# Patient Record
Sex: FEMALE | URBAN - METROPOLITAN AREA
[De-identification: names, ages, dates, MRNs, and addresses within clinical notes are randomized per-mention and may not be internally consistent; named-entity substitution may affect disease eponyms.]

---

## 2018-08-02 ENCOUNTER — OP HISTORICAL/CONVERTED ENCOUNTER (OUTPATIENT)
Dept: OTHER | Age: 68
End: 2018-08-02

## 2020-11-02 VITALS — HEIGHT: 65 IN

## 2020-11-02 PROBLEM — I10 HYPERTENSIVE DISORDER: Status: ACTIVE | Noted: 2020-11-02

## 2020-11-02 PROBLEM — E78.00 HYPERCHOLESTEROLEMIA: Status: ACTIVE | Noted: 2020-11-02

## 2020-11-02 PROBLEM — E66.9 OBESITY: Status: ACTIVE | Noted: 2020-11-02

## 2020-11-02 PROBLEM — Z78.0 MENOPAUSE: Status: ACTIVE | Noted: 2020-11-02

## 2020-11-03 ENCOUNTER — OFFICE VISIT (OUTPATIENT)
Dept: OBGYN CLINIC | Age: 70
End: 2020-11-03
Payer: MEDICARE

## 2020-11-03 VITALS
WEIGHT: 223 LBS | BODY MASS INDEX: 37.15 KG/M2 | SYSTOLIC BLOOD PRESSURE: 142 MMHG | HEIGHT: 65 IN | DIASTOLIC BLOOD PRESSURE: 76 MMHG

## 2020-11-03 DIAGNOSIS — Z12.4 SCREENING FOR MALIGNANT NEOPLASM OF CERVIX: Primary | ICD-10-CM

## 2020-11-03 DIAGNOSIS — E66.01 SEVERE OBESITY (HCC): ICD-10-CM

## 2020-11-03 DIAGNOSIS — Z12.31 VISIT FOR SCREENING MAMMOGRAM: Primary | ICD-10-CM

## 2020-11-03 DIAGNOSIS — N95.1 MENOPAUSAL SYNDROME: ICD-10-CM

## 2020-11-03 PROCEDURE — 77063 BREAST TOMOSYNTHESIS BI: CPT | Performed by: OBSTETRICS & GYNECOLOGY

## 2020-11-03 PROCEDURE — G0101 CA SCREEN;PELVIC/BREAST EXAM: HCPCS | Performed by: OBSTETRICS & GYNECOLOGY

## 2020-11-03 PROCEDURE — G8417 CALC BMI ABV UP PARAM F/U: HCPCS | Performed by: OBSTETRICS & GYNECOLOGY

## 2020-11-03 PROCEDURE — G8510 SCR DEP NEG, NO PLAN REQD: HCPCS | Performed by: OBSTETRICS & GYNECOLOGY

## 2020-11-03 PROCEDURE — 77067 SCR MAMMO BI INCL CAD: CPT | Performed by: OBSTETRICS & GYNECOLOGY

## 2020-11-03 PROCEDURE — G8427 DOCREV CUR MEDS BY ELIG CLIN: HCPCS | Performed by: OBSTETRICS & GYNECOLOGY

## 2020-11-03 PROCEDURE — G8536 NO DOC ELDER MAL SCRN: HCPCS | Performed by: OBSTETRICS & GYNECOLOGY

## 2020-11-03 RX ORDER — IBUPROFEN 800 MG/1
TABLET ORAL
COMMUNITY
Start: 2020-09-09

## 2020-11-03 RX ORDER — SPIRONOLACTONE 100 MG/1
TABLET, FILM COATED ORAL
COMMUNITY
Start: 2020-09-04

## 2020-11-03 RX ORDER — TRAZODONE HYDROCHLORIDE 50 MG/1
TABLET ORAL
COMMUNITY
Start: 2020-09-10

## 2020-11-03 RX ORDER — BUSPIRONE HYDROCHLORIDE 7.5 MG/1
TABLET ORAL
COMMUNITY
Start: 2020-09-09

## 2020-11-03 RX ORDER — AMLODIPINE BESYLATE 10 MG/1
TABLET ORAL
COMMUNITY
Start: 2020-10-05 | End: 2021-07-22 | Stop reason: SDUPTHER

## 2020-11-03 RX ORDER — ROSUVASTATIN CALCIUM 10 MG/1
TABLET, COATED ORAL
COMMUNITY
Start: 2020-10-05

## 2020-11-03 RX ORDER — ERGOCALCIFEROL 1.25 MG/1
50000 CAPSULE ORAL
COMMUNITY

## 2020-11-03 RX ORDER — CLOPIDOGREL BISULFATE 75 MG/1
TABLET ORAL
COMMUNITY
Start: 2020-10-05

## 2020-11-03 RX ORDER — FUROSEMIDE 40 MG/1
TABLET ORAL
COMMUNITY
Start: 2020-09-09

## 2020-11-03 RX ORDER — CARVEDILOL 12.5 MG/1
TABLET ORAL
COMMUNITY
Start: 2020-09-29

## 2020-11-03 RX ORDER — BACLOFEN 10 MG/1
TABLET ORAL
COMMUNITY
Start: 2020-08-19

## 2020-11-03 NOTE — PROGRESS NOTES
Katie Cosby is a , 69 y.o. female   No LMP recorded. (Menstrual status: Menopause). She presents for her annual    She is having no significant problems. Menstrual status:  Her periods are: Menopause. Cycles are: Menopause. She does not have dysmenorrhea. Medical conditions:  Since her last annual GYN exam about one year ago, she has not the following changes in her health history: none. Past Medical History:   Diagnosis Date    Hypercholesterolemia 2020    Hypertensive disorder 2020    Menopause 2020    Obesity 2020     Past Surgical History:   Procedure Laterality Date    HX  SECTION      HX CHOLECYSTECTOMY      HX COLONOSCOPY         Prior to Admission medications    Medication Sig Start Date End Date Taking? Authorizing Provider   amLODIPine (NORVASC) 10 mg tablet TAKE 1 TABLET BY MOUTH EVERY DAY 10/5/20  Yes Provider, Historical   baclofen (LIORESAL) 10 mg tablet TAKE 1 TABLET BY MOUTH TWICE A DAY WITH FOOD OR MILK 20  Yes Provider, Historical   busPIRone (BUSPAR) 7.5 mg tablet TAKE 1 TABLET BY MOUTH TWICE A DAY 20  Yes Provider, Historical   carvediloL (COREG) 12.5 mg tablet TAKE 1 TABLET BY MOUTH TWICE A DAY WITH FOOD 20  Yes Provider, Historical   clopidogreL (PLAVIX) 75 mg tab TAKE 1 TABLET BY MOUTH EVERY DAY 10/5/20  Yes Provider, Historical   ergocalciferol (ERGOCALCIFEROL) 1,250 mcg (50,000 unit) capsule Take 50,000 Units by mouth every seven (7) days.    Yes Provider, Historical   furosemide (LASIX) 40 mg tablet TAKE 1 TABLET BY MOUTH EVERY DAY AS NEEDED FOR SWELLING 20  Yes Provider, Historical   ibuprofen (MOTRIN) 800 mg tablet TAKE 1 TABLET BY MOUTH EVERY 8 HOURS AS NEEDED FOR PAIN 20  Yes Provider, Historical   rosuvastatin (CRESTOR) 10 mg tablet TAKE 1 TABLET BY MOUTH EVERY DAY 10/5/20  Yes Provider, Historical   spironolactone (ALDACTONE) 100 mg tablet TAKE 1 TABLET BY MOUTH EVERY DAY 20  Yes Provider, Historical   traZODone (DESYREL) 50 mg tablet TAKE 1 2 TABLETS BY MOUTH AT BEDTIME FOR INSOMNIA 9/10/20  Yes Provider, Historical       Allergies   Allergen Reactions    Sulfa (Sulfonamide Antibiotics) Rash          Tobacco History:  reports that she has never smoked. She has never used smokeless tobacco.  Alcohol Abuse:  reports previous alcohol use. Drug Abuse:  reports no history of drug use. Family Medical/Cancer History:   Family History   Problem Relation Age of Onset    Hypertension Other           Review of Systems   Constitutional: Negative for chills, fever, malaise/fatigue and weight loss. HENT: Negative for congestion, ear pain, sinus pain and tinnitus. Eyes: Negative for blurred vision and double vision. Respiratory: Negative for cough, shortness of breath and wheezing. Cardiovascular: Negative for chest pain and palpitations. Gastrointestinal: Negative for abdominal pain, blood in stool, constipation, diarrhea, heartburn, nausea and vomiting. Genitourinary: Negative for dysuria, flank pain, frequency, hematuria and urgency. Musculoskeletal: Negative for joint pain and myalgias. Skin: Negative for itching and rash. Neurological: Negative for dizziness, weakness and headaches. Psychiatric/Behavioral: Negative for depression, memory loss and suicidal ideas. The patient is not nervous/anxious and does not have insomnia. Physical Exam  Constitutional:       Appearance: Normal appearance. HENT:      Head: Normocephalic and atraumatic. Cardiovascular:      Rate and Rhythm: Normal rate. Heart sounds: Normal heart sounds. Pulmonary:      Effort: Pulmonary effort is normal.      Breath sounds: Normal breath sounds. Chest:      Breasts:         Right: Normal.         Left: Normal.   Abdominal:      General: Abdomen is flat. Palpations: Abdomen is soft. Genitourinary:     General: Normal vulva.       Vagina: Normal.      Cervix: Normal.      Uterus: Normal. Adnexa: Right adnexa normal and left adnexa normal.      Rectum: Normal.      Comments: PAP Obtained  Atrophic  Neurological:      Mental Status: She is alert. Psychiatric:         Mood and Affect: Mood normal.         Behavior: Behavior normal.         Thought Content: Thought content normal.          Visit Vitals  BP (!) 142/76 (BP 1 Location: Left arm, BP Patient Position: Sitting)   Ht 5' 5\" (1.651 m)   Wt 223 lb (101.2 kg)   BMI 37.11 kg/m²         Assessment:  Diagnoses and all orders for this visit:    1. Screening for malignant neoplasm of cervix  -     PAP, LB, RFX HPV [id number])    2. Severe obesity ([address]    3. Menopausal syndrome        Plan:Questions addressed  Counseled re: diet, exercise, healthy lifestyle  Return for Annual  Rec annual mammogram        Follow-up and Dispositions    · Return for 1 yr annual, 1 yr mammo.

## 2020-11-17 LAB
CYTOLOGIST CVX/VAG CYTO: ABNORMAL
CYTOLOGY CVX/VAG DOC CYTO: ABNORMAL
DX ICD CODE: ABNORMAL
DX ICD CODE: ABNORMAL
HPV I/H RISK 1 DNA CVX QL PROBE+SIG AMP: NEGATIVE
LABCORP, 190119: ABNORMAL
Lab: ABNORMAL
OTHER STN SPEC: ABNORMAL
PATHOLOGIST CVX/VAG CYTO: ABNORMAL
STAT OF ADQ CVX/VAG CYTO-IMP: ABNORMAL

## 2021-07-21 ENCOUNTER — APPOINTMENT (OUTPATIENT)
Dept: GENERAL RADIOLOGY | Age: 71
End: 2021-07-21
Attending: STUDENT IN AN ORGANIZED HEALTH CARE EDUCATION/TRAINING PROGRAM
Payer: MEDICARE

## 2021-07-21 ENCOUNTER — APPOINTMENT (OUTPATIENT)
Dept: CT IMAGING | Age: 71
End: 2021-07-21
Attending: NURSE PRACTITIONER
Payer: MEDICARE

## 2021-07-21 ENCOUNTER — HOSPITAL ENCOUNTER (EMERGENCY)
Age: 71
Discharge: HOME | End: 2021-07-22
Payer: MEDICARE

## 2021-07-21 DIAGNOSIS — I10 UNCONTROLLED HYPERTENSION: ICD-10-CM

## 2021-07-21 DIAGNOSIS — R42 DIZZINESS: Primary | ICD-10-CM

## 2021-07-21 DIAGNOSIS — M48.02 STENOSIS, CERVICAL SPINE: ICD-10-CM

## 2021-07-21 DIAGNOSIS — F41.1 ANXIETY STATE: ICD-10-CM

## 2021-07-21 LAB
ALBUMIN SERPL-MCNC: 3.7 G/DL (ref 3.5–5)
ALBUMIN/GLOB SERPL: 0.8 {RATIO} (ref 1.1–2.2)
ALP SERPL-CCNC: 98 U/L (ref 45–117)
ALT SERPL-CCNC: 26 U/L (ref 12–78)
ANION GAP SERPL CALC-SCNC: 3 MMOL/L (ref 5–15)
AST SERPL W P-5'-P-CCNC: 19 U/L (ref 15–37)
BASOPHILS # BLD: 0.1 K/UL (ref 0–0.1)
BASOPHILS NFR BLD: 1 % (ref 0–1)
BILIRUB SERPL-MCNC: 0.4 MG/DL (ref 0.2–1)
BUN SERPL-MCNC: 13 MG/DL (ref 6–20)
BUN/CREAT SERPL: 13 (ref 12–20)
CA-I BLD-MCNC: 9.3 MG/DL (ref 8.5–10.1)
CHLORIDE SERPL-SCNC: 102 MMOL/L (ref 97–108)
CO2 SERPL-SCNC: 31 MMOL/L (ref 21–32)
CREAT SERPL-MCNC: 1.04 MG/DL (ref 0.55–1.02)
DIFFERENTIAL METHOD BLD: ABNORMAL
EOSINOPHIL # BLD: 0.1 K/UL (ref 0–0.4)
EOSINOPHIL NFR BLD: 1 % (ref 0–7)
ERYTHROCYTE [DISTWIDTH] IN BLOOD BY AUTOMATED COUNT: 13.4 % (ref 11.5–14.5)
GLOBULIN SER CALC-MCNC: 4.7 G/DL (ref 2–4)
GLUCOSE SERPL-MCNC: 93 MG/DL (ref 65–100)
HCT VFR BLD AUTO: 45.3 % (ref 35–47)
HGB BLD-MCNC: 14.8 G/DL (ref 11.5–16)
IMM GRANULOCYTES # BLD AUTO: 0 K/UL (ref 0–0.04)
IMM GRANULOCYTES NFR BLD AUTO: 0 % (ref 0–0.5)
LYMPHOCYTES # BLD: 2.5 K/UL (ref 0.8–3.5)
LYMPHOCYTES NFR BLD: 41 % (ref 12–49)
MCH RBC QN AUTO: 28.4 PG (ref 26–34)
MCHC RBC AUTO-ENTMCNC: 32.7 G/DL (ref 30–36.5)
MCV RBC AUTO: 86.9 FL (ref 80–99)
MONOCYTES # BLD: 0.7 K/UL (ref 0–1)
MONOCYTES NFR BLD: 11 % (ref 5–13)
NEUTS SEG # BLD: 2.8 K/UL (ref 1.8–8)
NEUTS SEG NFR BLD: 46 % (ref 32–75)
NRBC # BLD: 0 K/UL (ref 0–0.01)
NRBC BLD-RTO: 0 PER 100 WBC
PLATELET # BLD AUTO: 254 K/UL (ref 150–400)
PMV BLD AUTO: 10.3 FL (ref 8.9–12.9)
POTASSIUM SERPL-SCNC: 3.3 MMOL/L (ref 3.5–5.1)
PROT SERPL-MCNC: 8.4 G/DL (ref 6.4–8.2)
RBC # BLD AUTO: 5.21 M/UL (ref 3.8–5.2)
SODIUM SERPL-SCNC: 136 MMOL/L (ref 136–145)
TROPONIN I SERPL-MCNC: <0.05 NG/ML
WBC # BLD AUTO: 6.1 K/UL (ref 3.6–11)

## 2021-07-21 PROCEDURE — 93005 ELECTROCARDIOGRAM TRACING: CPT

## 2021-07-21 PROCEDURE — 85025 COMPLETE CBC W/AUTO DIFF WBC: CPT

## 2021-07-21 PROCEDURE — 80053 COMPREHEN METABOLIC PANEL: CPT

## 2021-07-21 PROCEDURE — 70450 CT HEAD/BRAIN W/O DYE: CPT

## 2021-07-21 PROCEDURE — 71045 X-RAY EXAM CHEST 1 VIEW: CPT

## 2021-07-21 PROCEDURE — 84484 ASSAY OF TROPONIN QUANT: CPT

## 2021-07-21 PROCEDURE — 36415 COLL VENOUS BLD VENIPUNCTURE: CPT

## 2021-07-21 PROCEDURE — 74011250637 HC RX REV CODE- 250/637: Performed by: NURSE PRACTITIONER

## 2021-07-21 PROCEDURE — 72125 CT NECK SPINE W/O DYE: CPT

## 2021-07-21 PROCEDURE — 99285 EMERGENCY DEPT VISIT HI MDM: CPT

## 2021-07-21 RX ORDER — CLONIDINE HYDROCHLORIDE 0.1 MG/1
0.1 TABLET ORAL
Status: COMPLETED | OUTPATIENT
Start: 2021-07-21 | End: 2021-07-21

## 2021-07-21 RX ORDER — SPIRONOLACTONE 25 MG/1
100 TABLET ORAL
Status: COMPLETED | OUTPATIENT
Start: 2021-07-21 | End: 2021-07-21

## 2021-07-21 RX ORDER — ACETAMINOPHEN 500 MG
1000 TABLET ORAL
Status: COMPLETED | OUTPATIENT
Start: 2021-07-21 | End: 2021-07-21

## 2021-07-21 RX ADMIN — SPIRONOLACTONE 100 MG: 25 TABLET ORAL at 22:54

## 2021-07-21 RX ADMIN — CLONIDINE HYDROCHLORIDE 0.1 MG: 0.1 TABLET ORAL at 21:07

## 2021-07-21 RX ADMIN — ACETAMINOPHEN 1000 MG: 500 TABLET ORAL at 21:07

## 2021-07-21 NOTE — ED TRIAGE NOTES
[facility] 15 pt stated that she has been hurting to her head and dizziness; pt stated that she had a bout of diarrhea yesterday after eating

## 2021-07-22 VITALS
TEMPERATURE: 98.4 F | RESPIRATION RATE: 16 BRPM | HEART RATE: 65 BPM | OXYGEN SATURATION: 96 % | DIASTOLIC BLOOD PRESSURE: 96 MMHG | BODY MASS INDEX: 36.65 KG/M2 | HEIGHT: 65 IN | WEIGHT: 220 LBS | SYSTOLIC BLOOD PRESSURE: 155 MMHG

## 2021-07-22 RX ORDER — HYDROXYZINE 25 MG/1
TABLET, FILM COATED ORAL
Qty: 20 TABLET | Refills: 0 | Status: SHIPPED | OUTPATIENT
Start: 2021-07-22

## 2021-07-22 RX ORDER — HYDROXYZINE 50 MG/1
25 TABLET, FILM COATED ORAL
Qty: 20 TABLET | Refills: 0 | Status: SHIPPED | OUTPATIENT
Start: 2021-07-22 | End: 2021-08-01

## 2021-07-22 RX ORDER — AMLODIPINE BESYLATE 10 MG/1
10 TABLET ORAL DAILY
Qty: 30 TABLET | Refills: 0 | Status: SHIPPED | OUTPATIENT
Start: 2021-07-22

## 2021-07-22 RX ORDER — AMLODIPINE BESYLATE 10 MG/1
10 TABLET ORAL DAILY
Qty: 30 TABLET | Refills: 0 | Status: SHIPPED | OUTPATIENT
Start: 2021-07-22 | End: 2021-08-21

## 2021-07-22 NOTE — DISCHARGE INSTRUCTIONS
Thank you! Thank you for allowing me to care for you in the emergency department. I sincerely hope that you are satisfied with your visit today. It is my goal to provide you with excellent care. Below you will find a list of your labs and imaging from your visit today. Should you have any questions regarding these results please do not hesitate to call the emergency department. Labs -     Recent Results (from the past 12 hour(s))   CBC WITH AUTOMATED DIFF    Collection Time: 07/21/21  6:30 PM   Result Value Ref Range    WBC 6.1 3.6 - 11.0 K/uL    RBC 5.21 (H) 3.80 - 5.20 M/uL    HGB 14.8 11.5 - 16.0 g/dL    HCT 45.3 35.0 - 47.0 %    MCV 86.9 80.0 - 99.0 FL    MCH 28.4 26.0 - 34.0 PG    MCHC 32.7 30.0 - 36.5 g/dL    RDW 13.4 11.5 - 14.5 %    PLATELET 011 207 - 983 K/uL    MPV 10.3 8.9 - 12.9 FL    NRBC 0.0 0.0  WBC    ABSOLUTE NRBC 0.00 0.00 - 0.01 K/uL    NEUTROPHILS 46 32 - 75 %    LYMPHOCYTES 41 12 - 49 %    MONOCYTES 11 5 - 13 %    EOSINOPHILS 1 0 - 7 %    BASOPHILS 1 0 - 1 %    IMMATURE GRANULOCYTES 0 0 - 0.5 %    ABS. NEUTROPHILS 2.8 1.8 - 8.0 K/UL    ABS. LYMPHOCYTES 2.5 0.8 - 3.5 K/UL    ABS. MONOCYTES 0.7 0.0 - 1.0 K/UL    ABS. EOSINOPHILS 0.1 0.0 - 0.4 K/UL    ABS. BASOPHILS 0.1 0.0 - 0.1 K/UL    ABS. IMM. GRANS. 0.0 0.00 - 0.04 K/UL    DF AUTOMATED     METABOLIC PANEL, COMPREHENSIVE    Collection Time: 07/21/21  6:30 PM   Result Value Ref Range    Sodium 136 136 - 145 mmol/L    Potassium 3.3 (L) 3.5 - 5.1 mmol/L    Chloride 102 97 - 108 mmol/L    CO2 31 21 - 32 mmol/L    Anion gap 3 (L) 5 - 15 mmol/L    Glucose 93 65 - 100 mg/dL    BUN 13 6 - 20 mg/dL    Creatinine 1.04 (H) 0.55 - 1.02 mg/dL    BUN/Creatinine ratio 13 12 - 20      GFR est AA >60 >60 ml/min/1.73m2    GFR est non-AA 52 (L) >60 ml/min/1.73m2    Calcium 9.3 8.5 - 10.1 mg/dL    Bilirubin, total 0.4 0.2 - 1.0 mg/dL    AST (SGOT) 19 15 - 37 U/L    ALT (SGPT) 26 12 - 78 U/L    Alk.  phosphatase 98 45 - 117 U/L    Protein, total 8.4 (H) 6.4 - 8.2 g/dL    Albumin 3.7 3.5 - 5.0 g/dL    Globulin 4.7 (H) 2.0 - 4.0 g/dL    A-G Ratio 0.8 (L) 1.1 - 2.2     TROPONIN I    Collection Time: 07/21/21  6:30 PM   Result Value Ref Range    Troponin-I, Qt. <0.05 <0.05 ng/mL       Radiologic Studies -   CT HEAD WO CONT   Final Result   Head CT:   No acute intracranial abnormality. Cervical spine CT:   1. No acute fracture of the cervical spine. 2.  Multilevel cervical spondylosis as described above. CT SPINE CERV WO CONT   Final Result   Head CT:   No acute intracranial abnormality. Cervical spine CT:   1. No acute fracture of the cervical spine. 2.  Multilevel cervical spondylosis as described above. XR CHEST PORT   Final Result   No demonstrated acute cardiopulmonary disease. CT Results  (Last 48 hours)                 07/21/21 2035  CT HEAD WO CONT Final result    Impression:  Head CT:   No acute intracranial abnormality. Cervical spine CT:   1. No acute fracture of the cervical spine. 2.  Multilevel cervical spondylosis as described above. Narrative:  Studies:   Head CT without contrast.   Cervical spine CT without contrast.       Clinical Indication: Dizziness and pain. Comparison: None available. Technique: Routine volume acquisition of the head and cervical spine was   performed without contrast using soft tissue and bone kernels. Coronal and   sagittal reconstructions were generated and reviewed. A 3-D volume rendering of   the cervical spine was also generated and reviewed. Dose reduction: All CT scans   at this facility are performed using dose reduction optimization techniques as   appropriate to a performed exam including the following-automated exposure   control, adjustments of mA and/or Kv according to patient size, or use of   iterative reconstructive technique. Findings:       Head CT:   The ventricles are normal in size and configuration. No midline shift.  The basal cisterns are patent. No acute hemorrhage, abnormal   extra-axial fluid, or evidence of large territorial ischemia. Intracranial   vascular calcifications. Unremarkable orbits. The included paranasal sinuses and mastoid air cells are   clear. No acute fracture. Unremarkable extracalvarial soft tissues. Cervical spine CT:   Straightening of the cervical lordosis. Vertebral body heights are preserved. No   evidence of an acute fracture. Intervertebral disc height loss, course anterior osteophytes, and broad-based   disc osteophyte complex, and facet and uncovertebral hypertrophy throughout the   cervical spine. Fusion of the right C4-C5 facet joint. At least mild spinal   canal stenosis at C3-C4 and moderate spinal canal stenosis at C4-C7. Varying   degrees of neuroforaminal narrowing throughout the cervical spine. The paraspinal soft tissues are unremarkable. The included lung apices are   clear.               07/21/21 2035  CT SPINE CERV WO CONT Final result    Impression:  Head CT:   No acute intracranial abnormality. Cervical spine CT:   1. No acute fracture of the cervical spine. 2.  Multilevel cervical spondylosis as described above. Narrative:  Studies:   Head CT without contrast.   Cervical spine CT without contrast.       Clinical Indication: Dizziness and pain. Comparison: None available. Technique: Routine volume acquisition of the head and cervical spine was   performed without contrast using soft tissue and bone kernels. Coronal and   sagittal reconstructions were generated and reviewed. A 3-D volume rendering of   the cervical spine was also generated and reviewed.  Dose reduction: All CT scans   at this facility are performed using dose reduction optimization techniques as   appropriate to a performed exam including the following-automated exposure   control, adjustments of mA and/or Kv according to patient size, or use of   iterative reconstructive technique. Findings:       Head CT:   The ventricles are normal in size and configuration. No midline shift. The basal cisterns are patent. No acute hemorrhage, abnormal   extra-axial fluid, or evidence of large territorial ischemia. Intracranial   vascular calcifications. Unremarkable orbits. The included paranasal sinuses and mastoid air cells are   clear. No acute fracture. Unremarkable extracalvarial soft tissues. Cervical spine CT:   Straightening of the cervical lordosis. Vertebral body heights are preserved. No   evidence of an acute fracture. Intervertebral disc height loss, course anterior osteophytes, and broad-based   disc osteophyte complex, and facet and uncovertebral hypertrophy throughout the   cervical spine. Fusion of the right C4-C5 facet joint. At least mild spinal   canal stenosis at C3-C4 and moderate spinal canal stenosis at C4-C7. Varying   degrees of neuroforaminal narrowing throughout the cervical spine. The paraspinal soft tissues are unremarkable. The included lung apices are   clear. CXR Results  (Last 48 hours)                 07/21/21 1900  XR CHEST PORT Final result    Impression:  No demonstrated acute cardiopulmonary disease. Narrative:  Exam: AP chest       Comparison: 2/22/2015       Number of views: 1       Findings: The cardiac, mediastinal, and hilar shadows are within normal limits. The exam is negative for evidence of  pleural disease. The lungs are clear. If you feel that you have not received excellent quality care or timely care, please ask to speak to the nurse manager. Please choose us in the future for your continued health care needs. ------------------------------------------------------------------------------------------------------------  The exam and treatment you received in the Emergency Department were for an urgent problem and are not intended as complete care.  It is important that you follow-up with a doctor, nurse practitioner, or physician assistant to:  (1) confirm your diagnosis,  (2) re-evaluation of changes in your illness and treatment, and  (3) for ongoing care. If your symptoms become worse or you do not improve as expected and you are unable to reach your usual health care provider, you should return to the Emergency Department. We are available 24 hours a day. Please take your discharge instructions with you when you go to your follow-up appointment. If you have any problem arranging a follow-up appointment, contact the Emergency Department immediately. If a prescription has been provided, please have it filled as soon as possible to prevent a delay in treatment. Read the entire medication instruction sheet provided to you by the pharmacy. If you have any questions or reservations about taking the medication due to side effects or interactions with other medications, please call your primary care physician or contact the ER to speak with the charge nurse. Make an appointment with your family doctor or the physician you were referred to for follow-up of this visit as instructed on your discharge paperwork, as this is a mandatory follow-up. Return to the ER if you are unable to be seen or if you are unable to be seen in a timely manner. If you have any problem arranging the follow-up visit, contact the Emergency Department immediately.

## 2021-07-23 LAB
ATRIAL RATE: 83 BPM
CALCULATED P AXIS, ECG09: 14 DEGREES
CALCULATED R AXIS, ECG10: -13 DEGREES
CALCULATED T AXIS, ECG11: 47 DEGREES
DIAGNOSIS, 93000: NORMAL
P-R INTERVAL, ECG05: 164 MS
Q-T INTERVAL, ECG07: 352 MS
QRS DURATION, ECG06: 74 MS
QTC CALCULATION (BEZET), ECG08: 413 MS
VENTRICULAR RATE, ECG03: 83 BPM

## 2021-07-28 NOTE — ED PROVIDER NOTES
EMERGENCY DEPARTMENT HISTORY AND PHYSICAL EXAM      Date: 7/21/2021  Patient Name: Aishwarya Voss    History of Presenting Illness     Chief Complaint   Patient presents with    Headache    Dizziness       History Provided By: Patient    HPI: Aishwarya Voss, 69 y.o. female with a past medical history as noted below presents to the ED with cc of headache. Patient reports headache with dizziness and blurred vision onset today. She denies any associated speech disturbances or extremity weakness. She reports associated neck pain x1 week. She denies any trauma. She also reports an episode of diarrhea, nausea and sweating yesterday. Patient BP noted to be elevated upon arrival.  She states she has been compliant with all her HTN medications. She reports a history of TIA many years ago and states she has been compliant with her plavix. There are no other complaints, changes, or physical findings at this time. PCP: Kaelyn Rubi MD    No current facility-administered medications on file prior to encounter. Current Outpatient Medications on File Prior to Encounter   Medication Sig Dispense Refill    baclofen (LIORESAL) 10 mg tablet TAKE 1 TABLET BY MOUTH TWICE A DAY WITH FOOD OR MILK      busPIRone (BUSPAR) 7.5 mg tablet TAKE 1 TABLET BY MOUTH TWICE A DAY      carvediloL (COREG) 12.5 mg tablet TAKE 1 TABLET BY MOUTH TWICE A DAY WITH FOOD      clopidogreL (PLAVIX) 75 mg tab TAKE 1 TABLET BY MOUTH EVERY DAY      ergocalciferol (ERGOCALCIFEROL) 1,250 mcg (50,000 unit) capsule Take 50,000 Units by mouth every seven (7) days.       furosemide (LASIX) 40 mg tablet TAKE 1 TABLET BY MOUTH EVERY DAY AS NEEDED FOR SWELLING      ibuprofen (MOTRIN) 800 mg tablet TAKE 1 TABLET BY MOUTH EVERY 8 HOURS AS NEEDED FOR PAIN      rosuvastatin (CRESTOR) 10 mg tablet TAKE 1 TABLET BY MOUTH EVERY DAY      spironolactone (ALDACTONE) 100 mg tablet TAKE 1 TABLET BY MOUTH EVERY DAY      traZODone (DESYREL) 50 mg tablet TAKE 1 2 TABLETS BY MOUTH AT BEDTIME FOR INSOMNIA         Past History     Past Medical History:  Past Medical History:   Diagnosis Date    Hypercholesterolemia 2020    Hypertensive disorder 2020    Menopause 2020    Obesity 2020       Past Surgical History:  Past Surgical History:   Procedure Laterality Date    HX  SECTION      HX CHOLECYSTECTOMY      HX COLONOSCOPY         Family History:  Family History   Problem Relation Age of Onset    Hypertension Other     Hypertension Mother     Hypertension Father        Social History:  Social History     Tobacco Use    Smoking status: Former Smoker    Smokeless tobacco: Never Used    Tobacco comment: Quit 30 yrs ago   Vaping Use    Vaping Use: Never used   Substance Use Topics    Alcohol use: Not Currently    Drug use: Never       Allergies: Allergies   Allergen Reactions    Sulfa (Sulfonamide Antibiotics) Rash         Review of Systems     Review of Systems   Constitutional: Positive for fatigue. Negative for chills and fever. Eyes: Positive for visual disturbance. Gastrointestinal: Positive for diarrhea and nausea. Musculoskeletal: Positive for neck pain and neck stiffness. Neurological: Positive for dizziness and headaches. Negative for speech difficulty. All other systems reviewed and are negative. Physical Exam     Physical Exam  Vitals and nursing note reviewed. Constitutional:       General: She is not in acute distress. Appearance: Normal appearance. Eyes:      General: Vision grossly intact. Extraocular Movements: Extraocular movements intact. Conjunctiva/sclera: Conjunctivae normal.      Pupils: Pupils are equal, round, and reactive to light. Neck:      Comments: Neurovascularly intact, no radiculopathy  Cardiovascular:      Rate and Rhythm: Normal rate and regular rhythm. Heart sounds: Normal heart sounds.    Pulmonary:      Effort: Pulmonary effort is normal.      Breath sounds: Normal breath sounds. No wheezing or rales. Abdominal:      General: Bowel sounds are normal.      Palpations: Abdomen is soft. Tenderness: There is no abdominal tenderness. Musculoskeletal:      Cervical back: Tenderness present. No edema, erythema, signs of trauma, rigidity, torticollis or crepitus. Pain with movement and muscular tenderness present. No spinous process tenderness. Decreased range of motion. Skin:     General: Skin is warm and dry. Neurological:      General: No focal deficit present. Mental Status: She is alert and oriented to person, place, and time. GCS: GCS eye subscore is 4. GCS verbal subscore is 5. GCS motor subscore is 6. Cranial Nerves: Cranial nerves are intact. Sensory: Sensation is intact. Motor: Motor function is intact. Coordination: Coordination is intact. Gait: Gait is intact. Psychiatric:         Mood and Affect: Mood is anxious and depressed. Behavior: Behavior normal. Behavior is cooperative. Lab and Diagnostic Study Results     Labs -  Admission on 07/21/2021, Discharged on 07/22/2021   Component Date Value    WBC 07/21/2021 6.1     RBC 07/21/2021 5.21*    HGB 07/21/2021 14.8     HCT 07/21/2021 45.3     MCV 07/21/2021 86.9     MCH 07/21/2021 28.4     MCHC 07/21/2021 32.7     RDW 07/21/2021 13.4     PLATELET 46/39/3112 306     MPV 07/21/2021 10.3     NRBC 07/21/2021 0.0     ABSOLUTE NRBC 07/21/2021 0.00     NEUTROPHILS 07/21/2021 46     LYMPHOCYTES 07/21/2021 41     MONOCYTES 07/21/2021 11     EOSINOPHILS 07/21/2021 1     BASOPHILS 07/21/2021 1     IMMATURE GRANULOCYTES 07/21/2021 0     ABS. NEUTROPHILS 07/21/2021 2.8     ABS. LYMPHOCYTES 07/21/2021 2.5     ABS. MONOCYTES 07/21/2021 0.7     ABS. EOSINOPHILS 07/21/2021 0.1     ABS. BASOPHILS 07/21/2021 0.1     ABS. IMM.  GRANS. 07/21/2021 0.0     DF 07/21/2021 AUTOMATED     Sodium 07/21/2021 136     Potassium 07/21/2021 3.3*    Chloride 07/21/2021 102     CO2 07/21/2021 31     Anion gap 07/21/2021 3*    Glucose 07/21/2021 93     BUN 07/21/2021 13     Creatinine 07/21/2021 1.04*    BUN/Creatinine ratio 07/21/2021 13     GFR est AA 07/21/2021 >60     GFR est non-AA 07/21/2021 52*    Calcium 07/21/2021 9.3     Bilirubin, total 07/21/2021 0.4     AST (SGOT) 07/21/2021 19     ALT (SGPT) 07/21/2021 26     Alk. phosphatase 07/21/2021 98     Protein, total 07/21/2021 8.4*    Albumin 07/21/2021 3.7     Globulin 07/21/2021 4.7*    A-G Ratio 07/21/2021 0.8*    Troponin-I, Qt. 07/21/2021 <0.05     Ventricular Rate 07/21/2021 83     Atrial Rate 07/21/2021 83     P-R Interval 07/21/2021 164     QRS Duration 07/21/2021 74     Q-T Interval 07/21/2021 352     QTC Calculation (Bezet) 07/21/2021 413     Calculated P Axis 07/21/2021 14     Calculated R Axis 07/21/2021 -13     Calculated T Axis 07/21/2021 47     Diagnosis 07/21/2021                      Value:Normal sinus rhythm  Voltage criteria for left ventricular hypertrophy  Abnormal ECG  When compared with ECG of 22-FEB-2015 22:01,  No significant change was found  Confirmed by [facility] CenterGiselleut Silvano ([id number]) on 7/23/2021 5:12:39 PM     \    Radiologic Studies -   XR Results (most recent):  Results from Hospital Encounter encounter on 07/21/21    XR CHEST PORT    Narrative  Exam: AP chest    Comparison: 2/22/2015    Number of views: 1    Findings: The cardiac, mediastinal, and hilar shadows are within normal limits. The exam is negative for evidence of  pleural disease. The lungs are clear. Impression  No demonstrated acute cardiopulmonary disease. CT Results (most recent):  Results from Hospital Encounter encounter on 07/21/21    CT SPINE CERV WO CONT    Narrative  Studies:  Head CT without contrast.  Cervical spine CT without contrast.    Clinical Indication: Dizziness and pain. Comparison: None available.     Technique: Routine volume acquisition of the head and cervical spine was  performed without contrast using soft tissue and bone kernels. Coronal and  sagittal reconstructions were generated and reviewed. A 3-D volume rendering of  the cervical spine was also generated and reviewed. Dose reduction: All CT scans  at this facility are performed using dose reduction optimization techniques as  appropriate to a performed exam including the following-automated exposure  control, adjustments of mA and/or Kv according to patient size, or use of  iterative reconstructive technique. Findings:    Head CT:  The ventricles are normal in size and configuration. No midline shift. The basal cisterns are patent. No acute hemorrhage, abnormal  extra-axial fluid, or evidence of large territorial ischemia. Intracranial  vascular calcifications. Unremarkable orbits. The included paranasal sinuses and mastoid air cells are  clear. No acute fracture. Unremarkable extracalvarial soft tissues. Cervical spine CT:  Straightening of the cervical lordosis. Vertebral body heights are preserved. No  evidence of an acute fracture. Intervertebral disc height loss, course anterior osteophytes, and broad-based  disc osteophyte complex, and facet and uncovertebral hypertrophy throughout the  cervical spine. Fusion of the right C4-C5 facet joint. At least mild spinal  canal stenosis at C3-C4 and moderate spinal canal stenosis at C4-C7. Varying  degrees of neuroforaminal narrowing throughout the cervical spine. The paraspinal soft tissues are unremarkable. The included lung apices are  clear. Impression  Head CT:  No acute intracranial abnormality. Cervical spine CT:  1. No acute fracture of the cervical spine. 2.  Multilevel cervical spondylosis as described above. Medical Decision Making   - I am the first provider for this patient. - I reviewed the vital signs, available nursing notes, past medical history, past surgical history, family history and social history.     - Initial assessment performed. The patients presenting problems have been discussed, and they are in agreement with the care plan formulated and outlined with them. I have encouraged them to ask questions as they arise throughout their visit. Vital Signs-Reviewed the patient's vital signs. See chart    Records Reviewed: Nursing Notes    The patient presents with headache with a differential diagnosis of  cerebral hemorrhage, cluster headache, CVA, ICH, migraine, sinusitis, tension headache and vascular headache      ED Course:   Patient presents with headache and dizziness. Patient very anxious and BP elevated upon arrival, states she has been compliant with HTN medication however further evaluation notes patient has been out of her amlodipine for 1-2 weeks. She was treated with clonidine and spironolactone (normal p.m. medication) along with Tylenol with improvement of BP. Labs: K3.3, all other labs unremarkable, CT head without acute findings, CT cspine showing mild-moderate stenosis. Pt slept throughout much of ED stay. Pt very anxious at discussion of discharge. She admits to worrying and increasing anxiety symptoms. Pt requesting medications. Will d/c with hydroxizine and refill amlodipine. Reinforced importance of medication compliance. PCP f/u for BP eval and anxiety symptoms,  ortho f/u for continued neck pain. Discussed worrisome reasons to return to dept including worsening symptoms, stroke like symptoms or decreased sensations. ED Course as of Jul 28 1306 Wed Jul 21, 2021   2200 Discussed results with patient. She admits to feeling very anxious and states she thinks she needs to stay overnight. She reports neck pain, ongoing x1 week, no trauma. SHe reports a history of TIA in the remote past.     [LP]   u Jul 22, 2021   0002 States she is feeling better, headache and BP improved. Patient now states she has been out of her amlodipine 10 mg x 1 week.     [LP]   0100 Discussed results and plan of care with patient. No further headache, BP controlled. Patient ambulated without dizziness. Reinforced importance of medication compliance and PCP follow-up    [LP]      ED Course User Index  [LP] Wilber Bahena NP       Provider Notes (Medical Decision Making):     MDM  Number of Diagnoses or Management Options  Anxiety state: new, no workup  Dizziness: new, needed workup  Stenosis, cervical spine: new, no workup  Uncontrolled hypertension: new, needed workup     Amount and/or Complexity of Data Reviewed  Clinical lab tests: ordered and reviewed  Tests in the radiology section of CPT®: ordered and reviewed  Review and summarize past medical records: yes    Risk of Complications, Morbidity, and/or Mortality  Presenting problems: moderate  Diagnostic procedures: moderate  Management options: moderate    Patient Progress  Patient progress: improved         Disposition   Disposition: Condition stable and improved  DC-The patient was given verbal follow-up and headache warning signs and instructions    Discharged    DISCHARGE PLAN:  1. Cannot display discharge medications since this patient is not currently admitted. 2.   Follow-up Information     Follow up With Specialties Details Why [address]  Schedule an appointment as soon as possible for a visit  PCP, for ER follow up [address] [facility]    Romero Barraza MD Orthopedic Surgery Schedule an appointment as soon as possible for a visit  orthopedics; If symptoms worsen [address]  [phone]          3. Return to ED if worse   4.    Discharge Medication List as of 7/22/2021  2:02 AM      START taking these medications    Details   hydrOXYzine HCL (ATARAX) 25 mg tablet Take 1-2 tabs every 6 hours as needed for anxiety or insomnia, Normal, Disp-20 Tablet, R-0         CONTINUE these medications which have CHANGED    Details   amLODIPine (NORVASC) 10 mg tablet Take 1 Tablet by mouth daily for 30 days. , Normal, Disp-30 Tablet, R-0         CONTINUE these medications which have NOT CHANGED    Details   baclofen (LIORESAL) 10 mg tablet TAKE 1 TABLET BY MOUTH TWICE A DAY WITH FOOD OR MILK, Historical Med      busPIRone (BUSPAR) 7.5 mg tablet TAKE 1 TABLET BY MOUTH TWICE A DAY, Historical Med      carvediloL (COREG) 12.5 mg tablet TAKE 1 TABLET BY MOUTH TWICE A DAY WITH FOOD, Historical Med      clopidogreL (PLAVIX) 75 mg tab TAKE 1 TABLET BY MOUTH EVERY DAY, Historical Med      ergocalciferol (ERGOCALCIFEROL) 1,250 mcg (50,000 unit) capsule Take 50,000 Units by mouth every seven (7) days. , Historical Med      furosemide (LASIX) 40 mg tablet TAKE 1 TABLET BY MOUTH EVERY DAY AS NEEDED FOR SWELLING, Historical Med      ibuprofen (MOTRIN) 800 mg tablet TAKE 1 TABLET BY MOUTH EVERY 8 HOURS AS NEEDED FOR PAIN, Historical Med      rosuvastatin (CRESTOR) 10 mg tablet TAKE 1 TABLET BY MOUTH EVERY DAY, Historical Med      spironolactone (ALDACTONE) 100 mg tablet TAKE 1 TABLET BY MOUTH EVERY DAY, Historical Med      traZODone (DESYREL) 50 mg tablet TAKE 1 2 TABLETS BY MOUTH AT BEDTIME FOR INSOMNIA, Historical Med               Diagnosis     Clinical Impression:   1. Dizziness    2. Uncontrolled hypertension    3. Stenosis, cervical spine    4. Anxiety state        Attestations:    Sonia Coker NP    Please note that this dictation was completed with [employer], the computer voice recognition software. Quite often unanticipated grammatical, syntax, homophones, and other interpretive errors are inadvertently transcribed by the computer software. Please disregard these errors. Please excuse any errors that have escaped final proofreading. Thank you.

## 2022-03-18 PROBLEM — E66.9 OBESITY: Status: ACTIVE | Noted: 2020-11-02

## 2022-03-18 PROBLEM — Z78.0 MENOPAUSE: Status: ACTIVE | Noted: 2020-11-02

## 2022-03-19 PROBLEM — I10 HYPERTENSIVE DISORDER: Status: ACTIVE | Noted: 2020-11-02

## 2022-03-19 PROBLEM — E66.01 SEVERE OBESITY (HCC): Status: ACTIVE | Noted: 2020-11-03

## 2022-03-20 PROBLEM — E78.00 HYPERCHOLESTEROLEMIA: Status: ACTIVE | Noted: 2020-11-02

## 2023-05-20 RX ORDER — ERGOCALCIFEROL 1.25 MG/1
50000 CAPSULE ORAL
COMMUNITY

## 2023-05-20 RX ORDER — CLOPIDOGREL BISULFATE 75 MG/1
1 TABLET ORAL DAILY
COMMUNITY
Start: 2020-10-05

## 2023-05-20 RX ORDER — SPIRONOLACTONE 100 MG/1
1 TABLET, FILM COATED ORAL DAILY
COMMUNITY
Start: 2020-09-04

## 2023-05-20 RX ORDER — BUSPIRONE HYDROCHLORIDE 7.5 MG/1
1 TABLET ORAL 2 TIMES DAILY
COMMUNITY
Start: 2020-09-09

## 2023-05-20 RX ORDER — HYDROXYZINE HYDROCHLORIDE 25 MG/1
TABLET, FILM COATED ORAL
COMMUNITY
Start: 2021-07-22

## 2023-05-20 RX ORDER — ROSUVASTATIN CALCIUM 10 MG/1
1 TABLET, COATED ORAL DAILY
COMMUNITY
Start: 2020-10-05

## 2023-05-20 RX ORDER — BACLOFEN 10 MG/1
TABLET ORAL
COMMUNITY
Start: 2020-08-19

## 2023-05-20 RX ORDER — TRAZODONE HYDROCHLORIDE 50 MG/1
TABLET ORAL
COMMUNITY
Start: 2020-09-10

## 2023-05-20 RX ORDER — IBUPROFEN 800 MG/1
1 TABLET ORAL EVERY 8 HOURS PRN
COMMUNITY
Start: 2020-09-09

## 2023-05-20 RX ORDER — CARVEDILOL 12.5 MG/1
1 TABLET ORAL 2 TIMES DAILY WITH MEALS
COMMUNITY
Start: 2020-09-29

## 2023-05-20 RX ORDER — FUROSEMIDE 40 MG/1
TABLET ORAL
COMMUNITY
Start: 2020-09-09

## 2023-05-20 RX ORDER — AMLODIPINE BESYLATE 10 MG/1
10 TABLET ORAL DAILY
COMMUNITY
Start: 2021-07-22